# Patient Record
Sex: MALE | Race: WHITE | NOT HISPANIC OR LATINO | Employment: OTHER | URBAN - NONMETROPOLITAN AREA
[De-identification: names, ages, dates, MRNs, and addresses within clinical notes are randomized per-mention and may not be internally consistent; named-entity substitution may affect disease eponyms.]

---

## 2019-05-15 ENCOUNTER — OFFICE VISIT (OUTPATIENT)
Dept: CARDIOLOGY | Facility: CLINIC | Age: 74
End: 2019-05-15

## 2019-05-15 VITALS
DIASTOLIC BLOOD PRESSURE: 91 MMHG | SYSTOLIC BLOOD PRESSURE: 163 MMHG | HEIGHT: 69 IN | HEART RATE: 85 BPM | WEIGHT: 207.2 LBS | OXYGEN SATURATION: 98 % | BODY MASS INDEX: 30.69 KG/M2

## 2019-05-15 DIAGNOSIS — R53.83 OTHER FATIGUE: ICD-10-CM

## 2019-05-15 DIAGNOSIS — I10 ESSENTIAL HYPERTENSION: Primary | ICD-10-CM

## 2019-05-15 DIAGNOSIS — R00.2 PALPITATIONS: ICD-10-CM

## 2019-05-15 DIAGNOSIS — R06.00 PND (PAROXYSMAL NOCTURNAL DYSPNEA): ICD-10-CM

## 2019-05-15 DIAGNOSIS — G47.33 OBSTRUCTIVE SLEEP APNEA SYNDROME: ICD-10-CM

## 2019-05-15 DIAGNOSIS — R60.0 BILATERAL LEG EDEMA: ICD-10-CM

## 2019-05-15 DIAGNOSIS — R06.02 SHORTNESS OF BREATH: ICD-10-CM

## 2019-05-15 DIAGNOSIS — R06.01 ORTHOPNEA: ICD-10-CM

## 2019-05-15 DIAGNOSIS — R07.2 PRECORDIAL PAIN: ICD-10-CM

## 2019-05-15 DIAGNOSIS — E78.2 MIXED HYPERLIPIDEMIA: ICD-10-CM

## 2019-05-15 PROBLEM — K21.9 GASTROESOPHAGEAL REFLUX DISEASE WITHOUT ESOPHAGITIS: Status: ACTIVE | Noted: 2019-05-15

## 2019-05-15 PROCEDURE — 99204 OFFICE O/P NEW MOD 45 MIN: CPT | Performed by: INTERNAL MEDICINE

## 2019-05-15 RX ORDER — CLONIDINE HYDROCHLORIDE 0.1 MG/1
0.1 TABLET ORAL 2 TIMES DAILY PRN
Refills: 3 | COMMUNITY
Start: 2019-05-03

## 2019-05-15 RX ORDER — ASPIRIN 325 MG
325 TABLET ORAL DAILY PRN
COMMUNITY
End: 2019-05-15 | Stop reason: DRUGHIGH

## 2019-05-15 RX ORDER — IBUPROFEN 600 MG/1
600 TABLET ORAL 2 TIMES DAILY PRN
Refills: 1 | COMMUNITY
Start: 2019-04-02

## 2019-05-15 RX ORDER — FUROSEMIDE 40 MG/1
40 TABLET ORAL DAILY
Refills: 3 | COMMUNITY
Start: 2019-04-24

## 2019-05-15 RX ORDER — AMLODIPINE BESYLATE 5 MG/1
TABLET ORAL DAILY
Refills: 6 | COMMUNITY
Start: 2019-04-24

## 2019-05-15 RX ORDER — NITROGLYCERIN 0.4 MG/1
TABLET SUBLINGUAL
Qty: 25 TABLET | Refills: 2 | Status: SHIPPED | OUTPATIENT
Start: 2019-05-15

## 2019-05-15 RX ORDER — POTASSIUM CHLORIDE 750 MG/1
10 TABLET, FILM COATED, EXTENDED RELEASE ORAL DAILY
Refills: 3 | COMMUNITY
Start: 2019-04-24

## 2019-05-15 RX ORDER — ATORVASTATIN CALCIUM 40 MG/1
40 TABLET, FILM COATED ORAL NIGHTLY
Qty: 30 TABLET | Refills: 11 | Status: SHIPPED | OUTPATIENT
Start: 2019-05-15

## 2019-05-15 RX ORDER — OMEPRAZOLE 20 MG/1
20 CAPSULE, DELAYED RELEASE ORAL DAILY
Refills: 6 | COMMUNITY
Start: 2019-05-03

## 2019-05-15 RX ORDER — ATENOLOL 25 MG/1
25 TABLET ORAL DAILY
Qty: 30 TABLET | Refills: 11 | Status: SHIPPED | OUTPATIENT
Start: 2019-05-15

## 2019-05-15 NOTE — PROGRESS NOTES
Subjective   Mitchell Jason is a 73 y.o. male     Chief Complaint   Patient presents with   • Establish Care   • Chest Pain   • Palpitations   • Shortness of Breath       PROBLEM LIST:     1. Chest Pain  2. Shortness of breath  3. Palpitations  4. HTN  5. Hyperlipidemia  6. KULDEEP, untreated, machine misplaced  7. GERD                Specialty Problems     None            HPI:  Mr. Mitchell Jason is a 73-year-old male patient of Sarah Kelly and Dr. Cassy Hill who is seen today for evaluation of chest pain.    Mr. Jason is had chest pain for period of at least several years.  He describes a sharp left lower precordial chest pain which is non-positional, nonpleuritic, and not precipitated by exertion.  Pain lasts seconds to minutes, and is not accompanied by shortness of breath, nausea, or diaphoresis.  However, the patient describes class III levels of exertional dyspnea without cough or wheeze.  He also has frequent nocturnal awakening likely related to untreated sleep apnea.  He does describe symptoms suggestive of paroxysmal nocturnal dyspnea as well.    Mr. Jason has had no symptoms of TIA or stroke, and he does not claudicate or describe other symptoms of peripheral arterial disease.                  CURRENT MEDICATION:    Current Outpatient Medications   Medication Sig Dispense Refill   • amLODIPine (NORVASC) 5 MG tablet Daily.  6   • aspirin 325 MG tablet Take 325 mg by mouth Daily As Needed for Mild Pain  (for c/p).     • CloNIDine (CATAPRES) 0.1 MG tablet Take 0.1 mg by mouth 2 (Two) Times a Day As Needed.  3   • furosemide (LASIX) 40 MG tablet Take 40 mg by mouth Daily.  3   • ibuprofen (ADVIL,MOTRIN) 600 MG tablet Take 600 mg by mouth 2 (Two) Times a Day As Needed. for pain  1   • omeprazole (priLOSEC) 20 MG capsule Take 20 mg by mouth Daily.  6   • potassium chloride (K-DUR) 10 MEQ CR tablet Take 10 mEq by mouth Daily.  3     No current facility-administered medications for this visit.         ALLERGIES:    Codeine    PAST MEDICAL HISTORY:    Past Medical History:   Diagnosis Date   • Chest pain    • GERD (gastroesophageal reflux disease)    • Hyperlipidemia    • Hypertension    • Sleep apnea     does not have machine anymore, [facility]       SURGICAL HISTORY:    Past Surgical History:   Procedure Laterality Date   • BACK SURGERY     • INNER EAR SURGERY         SOCIAL HISTORY:    Social History     Socioeconomic History   • Marital status:      Spouse name: Not on file   • Number of children: Not on file   • Years of education: Not on file   • Highest education level: Not on file   Tobacco Use   • Smoking status: Never Smoker   • Smokeless tobacco: Never Used   Substance and Sexual Activity   • Alcohol use: Yes     Frequency: Never     Comment: wine and alcohol   • Drug use: No       FAMILY HISTORY:    Family History   Problem Relation Age of Onset   • No Known Problems Mother    • Hypertension Father        Review of Systems   Constitutional: Negative.  Negative for chills, diaphoresis, fever and unexpected weight change.   HENT: Positive for hearing loss and nosebleeds.    Eyes: Positive for visual disturbance (glasses prn).   Respiratory: Positive for shortness of breath.         Describes orthopnea/PND   Cardiovascular: Positive for chest pain (sharp to left lateral chest. Episode may last seconds. Occurs randomly. occurs several times a day. No precipitating or alleviating factors. ), palpitations and leg swelling.   Gastrointestinal: Negative for blood in stool (no melena,hematuria,hemoptysis,hematochezia), constipation and diarrhea.   Endocrine: Negative.    Genitourinary: Negative.    Musculoskeletal: Negative.    Skin: Negative.    Allergic/Immunologic: Positive for environmental allergies.   Neurological: Negative.    Hematological: Bruises/bleeds easily.   Psychiatric/Behavioral: Positive for sleep disturbance.       VISIT VITALS:  Vitals:    05/15/19 1500   BP: 163/91  "  BP Location: Left arm   Patient Position: Sitting   Pulse: 85   SpO2: 98%   Weight: 94 kg (207 lb 3.2 oz)   Height: 175.3 cm (69\")      /91 (BP Location: Left arm, Patient Position: Sitting)   Pulse 85   Ht 175.3 cm (69\")   Wt 94 kg (207 lb 3.2 oz)   SpO2 98%   BMI 30.60 kg/m²     RECENT LABS:    Objective       Physical Exam   Constitutional: He is oriented to person, place, and time. He appears well-developed and well-nourished. No distress.   HENT:   Head: Normocephalic and atraumatic.   No oral lesions   Eyes: Conjunctivae and EOM are normal. Pupils are equal, round, and reactive to light.   No ptosis or lid lag  ALLYN  Extra-ocular motions intact   Neck: Normal range of motion. Neck supple. No hepatojugular reflux and no JVD present. Carotid bruit is not present. No tracheal deviation present.   Nl. Carotid upstrokes  No masses to left neck area     Cardiovascular: Normal rate, regular rhythm, S2 normal and intact distal pulses. Frequent extrasystoles are present.   Murmur heard.   Systolic murmur is present.  S1 decreased  No MR  No TR  2/6 systolic ejection murmur RUSB   Pulmonary/Chest: Effort normal and breath sounds normal. He has no wheezes. He has no rhonchi. He has no rales.   Nl. Expir. Phase  Nl. Breath sound intensity     Abdominal: Soft. Bowel sounds are normal. He exhibits no distension, no abdominal bruit and no mass. There is no tenderness. There is no rebound and no guarding.   No organomegaly   Musculoskeletal: Normal range of motion. He exhibits edema. He exhibits no tenderness or deformity.   LLE, 1+ edema to lower calf, excellent PT, palpable DP, cap. Refill 5 sec.  RLE, 1+ edema mid calf, nl.pedal pulses, nl. Cap. refill   Neurological: He is alert and oriented to person, place, and time.   Skin: Skin is warm and dry. No rash noted. No erythema. No pallor.   Psychiatric: He has a normal mood and affect. His behavior is normal. Judgment and thought content normal.   Nursing note " and vitals reviewed.      Procedures      Assessment/Plan   #1.  Chest pain.  The patient describes chest pain atypical for angina with some features suggestive of ischemia.  He is at moderate to high risk for coronary artery disease, therefore, risk stratification is indicated.  We will perform pharmacologic stress testing in that regard as the patient would be unable to walk adequately on a treadmill.  He is given a prescription for sublingual nitroglycerin with instructions in its use.    2.  Orthopnea and PND.  I would like to obtain an echocardiogram to assess LV systolic and diastolic performance, LV filling pressures, and pulmonary pressures.    3.  Inadequately controlled systemic hypertension.  In that regard we will add a atenolol 25 mg daily to the patient's current regimen and perform serial blood pressure checks.    4.  Dyslipidemia.  With suspected coronary artery disease I feel the patient should be on high-dose statin.  Mr. Jason had no improvement in symptoms when pravastatin was discontinued.  Therefore, we will restart statin in the form of atorvastatin 40 mg daily with coenzyme Q 10 and tonic water if needed for muscle and joint pain.    5.  The patient states he is unable to tolerate his CPAP mask because of a sense of smothering.  I think it would be beneficial to have him reassessed from the standpoint of treating obstructive sleep apnea as he may be able to tolerate new or different mask or machine.    6.  The patient will follow up with Sarah Kelly as instructed, and in our office after testing or on a as needed basis for symptoms, blood pressures, or medication intolerance as discussed in detail today.   Diagnosis Plan   1. Essential hypertension     2. Mixed hyperlipidemia     3. Palpitations     4. Shortness of breath     5. Obstructive sleep apnea syndrome     6. Precordial pain     7. Orthopnea     8. PND (paroxysmal nocturnal dyspnea)     9. Bilateral leg edema     10. Other fatigue          No Follow-up on file.              Patient's Body mass index is 30.6 kg/m². BMI is above normal parameters. Recommendations include: educational material and referral to primary care.       Damaris Chirinos LPN    Scribed for Dr. Ruy Lazaro by Damaris Chirinos LPN May 15, 2019 3:30 PM         Electronically signed by:            This note is dictated utilizing voice recognition software.  Although this record has been proof read, transcriptional errors may still be present. If questions occur regarding the content of this record please do not hesitate to call our office.

## 2019-05-15 NOTE — PATIENT INSTRUCTIONS
Atorvastatin tablets  What is this medicine?  ATORVASTATIN (a TORE va sta tin) is known as a HMG-CoA reductase inhibitor or 'statin'. It lowers the level of cholesterol and triglycerides in the blood. This drug may also reduce the risk of heart attack, stroke, or other health problems in patients with risk factors for heart disease. Diet and lifestyle changes are often used with this drug.  This medicine may be used for other purposes; ask your health care provider or pharmacist if you have questions.  COMMON BRAND NAME(S): Lipitor  What should I tell my health care provider before I take this medicine?  They need to know if you have any of these conditions:  -diabetes  -if you often drink alcohol  -history of stroke  -kidney disease  -liver disease  -muscle aches or weakness  -thyroid disease  -an unusual or allergic reaction to atorvastatin, other medicines, foods, dyes, or preservatives  -pregnant or trying to get pregnant  -breast-feeding  How should I use this medicine?  Take this medicine by mouth with a glass of water. Follow the directions on the prescription label. You can take it with or without food. If it upsets your stomach, take it with food. Do not take with grapefruit juice. Take your medicine at regular intervals. Do not take it more often than directed. Do not stop taking except on your doctor's advice.  Talk to your pediatrician regarding the use of this medicine in children. While this drug may be prescribed for children as young as 10 for selected conditions, precautions do apply.  Overdosage: If you think you have taken too much of this medicine contact a poison control center or emergency room at once.  NOTE: This medicine is only for you. Do not share this medicine with others.  What if I miss a dose?  If you miss a dose, take it as soon as you can. If your next dose is to be taken in less than 12 hours, then do not take the missed dose. Take the next dose at your regular time. Do not take  double or extra doses.  What may interact with this medicine?  Do not take this medicine with any of the following medications:  -certain antivirals for HIV or hepatitis  -posaconazole  -supplements like red yeast rice  This medicine may also interact with the following medications:  -alcohol  -birth control pills  -certain antibiotics like erythromycin and clarithromycin  -certain medicines for cholesterol like fenofibrate, gemfibrozil, and niacin  -certain medicines for fungal infections like ketoconazole and itraconazole  -colchicine  -cyclosporine  -digoxin  -grapefruit juice  -rifampin  -supplements like black cohosh and Singh's wort  This list may not describe all possible interactions. Give your health care provider a list of all the medicines, herbs, non-prescription drugs, or dietary supplements you use. Also tell them if you smoke, drink alcohol, or use illegal drugs. Some items may interact with your medicine.  What should I watch for while using this medicine?  Visit your doctor or health care professional for regular check-ups. You may need regular tests to make sure your liver is working properly.  Your health care professional may tell you to stop taking this medicine if you develop muscle problems. If your muscle problems do not go away after stopping this medicine, contact your health care professional.  Do not become pregnant while taking this medicine. Women should inform their health care professional if they wish to become pregnant or think they might be pregnant. There is a potential for serious side effects to an unborn child. Talk to your health care professional or pharmacist for more information. Do not breast-feed an infant while taking this medicine.  This medicine may affect blood sugar levels. If you have diabetes, check with your doctor or health care professional before you change your diet or the dose of your diabetic medicine.  If you are going to need surgery or other procedure,  tell your doctor that you are using this medicine.  This drug is only part of a total heart-health program. Your doctor or a dietician can suggest a low-cholesterol and low-fat diet to help. Avoid alcohol and smoking, and keep a proper exercise schedule.  This medicine may cause a decrease in Co-Enzyme Q-10. You should make sure that you get enough Co-Enzyme Q-10 while you are taking this medicine. Discuss the foods you eat and the vitamins you take with your health care professional.  What side effects may I notice from receiving this medicine?  Side effects that you should report to your doctor or health care professional as soon as possible:  -allergic reactions like skin rash, itching or hives, swelling of the face, lips, or tongue  -fever  -joint pain  -loss of memory  -redness, blistering, peeling or loosening of the skin, including inside the mouth  -signs and symptoms of liver injury like dark yellow or brown urine; general ill feeling or flu-like symptoms; light-belly pain; unusually weak or tired; yellowing of the eyes or skin  -signs and symptoms of muscle injury like dark urine; trouble passing urine or change in the amount of urine; unusually weak or tired; muscle pain or side or back pain  Side effects that usually do not require medical attention (report to your doctor or health care professional if they continue or are bothersome):  -diarrhea  -nausea  -stomach pain  -trouble sleeping  -upset stomach  This list may not describe all possible side effects. Call your doctor for medical advice about side effects. You may report side effects to FDA at 1-582-FDA-0410.  Where should I keep my medicine?  Keep out of the reach of children.  Store between 20 and 25 degrees C (68 and 77 degrees F). Throw away any unused medicine after the expiration date.  NOTE: This sheet is a summary. It may not cover all possible information. If you have questions about this medicine, talk to your doctor, pharmacist, or  health care provider.  © 2019 [employer] (2018-08-17 21:55:00)  Nitroglycerin sublingual tablets  What is this medicine?  NITROGLYCERIN (shiv troe GLI ser in) is a type of vasodilator. It relaxes blood vessels, increasing the blood and oxygen supply to your heart. This medicine is used to relieve chest pain caused by angina. It is also used to prevent chest pain before activities like climbing stairs, going outdoors in cold weather, or sexual activity.  This medicine may be used for other purposes; ask your health care provider or pharmacist if you have questions.  COMMON BRAND NAME(S): Nitroquick, Nitrostat, Nitrotab  What should I tell my health care provider before I take this medicine?  They need to know if you have any of these conditions:  -anemia  -head injury, recent stroke, or bleeding in the brain  -liver disease  -previous heart attack  -an unusual or allergic reaction to nitroglycerin, other medicines, foods, dyes, or preservatives  -pregnant or trying to get pregnant  -breast-feeding  How should I use this medicine?  Take this medicine by mouth as needed. At the first sign of an angina attack (chest pain or tightness) place one tablet under your tongue. You can also take this medicine 5 to 10 minutes before an event likely to produce chest pain. Follow the directions on the prescription label. Let the tablet dissolve under the tongue. Do not swallow whole. Replace the dose if you accidentally swallow it. It will help if your mouth is not dry. Saliva around the tablet will help it to dissolve more quickly. Do not eat or drink, smoke or chew tobacco while a tablet is dissolving. If you are not better within 5 minutes after taking ONE dose of nitroglycerin, call 9-1-1 immediately to seek emergency medical care. Do not take more than 3 nitroglycerin tablets over 15 minutes.  If you take this medicine often to relieve symptoms of angina, your doctor or health care professional may provide you with  different instructions to manage your symptoms. If symptoms do not go away after following these instructions, it is important to call 9-1-1 immediately. Do not take more than 3 nitroglycerin tablets over 15 minutes.  Talk to your pediatrician regarding the use of this medicine in children. Special care may be needed.  Overdosage: If you think you have taken too much of this medicine contact a poison control center or emergency room at once.  NOTE: This medicine is only for you. Do not share this medicine with others.  What if I miss a dose?  This does not apply. This medicine is only used as needed.  What may interact with this medicine?  Do not take this medicine with any of the following medications:  -certain migraine medicines like ergotamine and dihydroergotamine (DHE)  -medicines used to treat erectile dysfunction like sildenafil, tadalafil, and vardenafil  -riociguat  This medicine may also interact with the following medications:  -alteplase  -aspirin  -heparin  -medicines for high blood pressure  -medicines for mental depression  -other medicines used to treat angina  -phenothiazines like chlorpromazine, mesoridazine, prochlorperazine, thioridazine  This list may not describe all possible interactions. Give your health care provider a list of all the medicines, herbs, non-prescription drugs, or dietary supplements you use. Also tell them if you smoke, drink alcohol, or use illegal drugs. Some items may interact with your medicine.  What should I watch for while using this medicine?  Tell your doctor or health care professional if you feel your medicine is no longer working.  Keep this medicine with you at all times. Sit or lie down when you take your medicine to prevent falling if you feel dizzy or faint after using it. Try to remain calm. This will help you to feel better faster. If you feel dizzy, take several deep breaths and lie down with your feet propped up, or bend forward with your head resting  between your knees.  You may get drowsy or dizzy. Do not drive, use machinery, or do anything that needs mental alertness until you know how this drug affects you. Do not stand or sit up quickly, especially if you are an older patient. This reduces the risk of dizzy or fainting spells. Alcohol can make you more drowsy and dizzy. Avoid alcoholic drinks.  Do not treat yourself for coughs, colds, or pain while you are taking this medicine without asking your doctor or health care professional for advice. Some ingredients may increase your blood pressure.  What side effects may I notice from receiving this medicine?  Side effects that you should report to your doctor or health care professional as soon as possible:  -blurred vision  -dry mouth  -skin rash  -sweating  -the feeling of extreme pressure in the head  -unusually weak or tired  Side effects that usually do not require medical attention (report to your doctor or health care professional if they continue or are bothersome):  -flushing of the face or neck  -headache  -irregular heartbeat, palpitations  -nausea, vomiting  This list may not describe all possible side effects. Call your doctor for medical advice about side effects. You may report side effects to FDA at 9-485-FDA-8419.  Where should I keep my medicine?  Keep out of the reach of children.  Store at room temperature between 20 and 25 degrees C (68 and 77 degrees F). Store in original container. Protect from light and moisture. Keep tightly closed. Throw away any unused medicine after the expiration date.  NOTE: This sheet is a summary. It may not cover all possible information. If you have questions about this medicine, talk to your doctor, pharmacist, or health care provider.  © 2019 [employer]/Gold Standard (2014-10-16 17:57:36)  Atenolol tablets  What is this medicine?  ATENOLOL (a TEN oh lole) is a beta-blocker. Beta-blockers reduce the workload on the heart and help it to beat more regularly. This  medicine is used to treat high blood pressure and to prevent chest pain. It is also used to protect the heart during a heart attack and to prevent an additional heart attack from occurring.  This medicine may be used for other purposes; ask your health care provider or pharmacist if you have questions.  COMMON BRAND NAME(S): Tenormin  What should I tell my health care provider before I take this medicine?  They need to know if you have any of these conditions:  -diabetes  -heart or vessel disease like slow heart rate, worsening heart failure, heart block, sick sinus syndrome or Raynaud's disease  -kidney disease  -lung or breathing disease, like asthma or emphysema  -pheochromocytoma  -thyroid disease  -an unusual or allergic reaction to atenolol, other beta-blockers, medicines, foods, dyes, or preservatives  -pregnant or trying to get pregnant  -breast-feeding  How should I use this medicine?  Take this medicine by mouth with a drink of water. Follow the directions on the prescription label. This medicine may be taken with or without food. Take your medicine at regular intervals. Do not take more medicine than directed. Do not stop taking this medicine suddenly. This could lead to serious heart-related effects.  Talk to your pediatrician regarding the use of this medicine in children. Special care may be needed.  Overdosage: If you think you have taken too much of this medicine contact a poison control center or emergency room at once.  NOTE: This medicine is only for you. Do not share this medicine with others.  What if I miss a dose?  If you miss a dose, take it as soon as you can. If it is almost time for your next dose, take only that dose. Do not take double or extra doses.  What may interact with this medicine?  This medicine may interact with the following medications:  -certain medicines for blood pressure, heart disease, irregular heart  beat  -clonidine  -digoxin  -diuretics  -dobutamine  -epinephrine  -isoproterenol  -NSAIDs, medicines for pain and inflammation, like ibuprofen or naproxen  -reserpine  This list may not describe all possible interactions. Give your health care provider a list of all the medicines, herbs, non-prescription drugs, or dietary supplements you use. Also tell them if you smoke, drink alcohol, or use illegal drugs. Some items may interact with your medicine.  What should I watch for while using this medicine?  Visit your doctor or health care professional for regular check ups. Check your blood pressure and pulse rate regularly. Ask your health care professional what your blood pressure and pulse rate should be, and when you should contact him or her.  You may get drowsy or dizzy. Do not drive, use machinery, or do anything that needs mental alertness until you know how this medicine affects you. Do not stand or sit up quickly. Alcohol may interfere with the effect of this medicine. Avoid alcoholic drinks.  This medicine can affect blood sugar levels. If you have diabetes, check with your doctor or health care professional before you change your diet or the dose of your diabetic medicine.  Do not treat yourself for coughs, colds, or pain while you are taking this medicine without asking your doctor or health care professional for advice. Some ingredients may increase your blood pressure.  What side effects may I notice from receiving this medicine?  Side effects that you should report to your doctor or health care professional as soon as possible:  -allergic reactions like skin rash, itching or hives, swelling of the face, lips, or tongue  -breathing problems  -changes in vision  -chest pain  -cold, tingling, or numb hands or feet  -depression  -fast, irregular heartbeat  -feeling faint or lightheaded, falls  -fever with sore throat  -rapid weight gain  -swollen ankles, legs  Side effects that usually do not require  medical attention (report to your doctor or health care professional if they continue or are bothersome):  -anxiety, nervous  -diarrhea  -dry skin  -change in sex drive or performance  -headache  -nightmares or trouble sleeping  -short term memory loss  -stomach upset  -unusually tired  This list may not describe all possible side effects. Call your doctor for medical advice about side effects. You may report side effects to FDA at 0-702-DQT-6450.  Where should I keep my medicine?  Keep out of the reach of children.  Store at room temperature between 20 and 25 degrees C (68 and 77 degrees F). Close tightly and protect from light. Throw away any unused medicine after the expiration date.  NOTE: This sheet is a summary. It may not cover all possible information. If you have questions about this medicine, talk to your doctor, pharmacist, or health care provider.  © 2019 [employer]/Gold Standard (2014-08-24 14:21:28)  Obesity, Adult  Obesity is the condition of having too much total body fat. Being overweight or obese means that your weight is greater than what is considered healthy for your body size. Obesity is determined by a measurement called BMI. BMI is an estimate of body fat and is calculated from height and weight. For adults, a BMI of 30 or higher is considered obese.  Obesity can eventually lead to other health concerns and major illnesses, including:  · Stroke.  · Coronary artery disease (CAD).  · Type 2 diabetes.  · Some types of cancer, including cancers of the colon, breast, uterus, and gallbladder.  · Osteoarthritis.  · High blood pressure (hypertension).  · High cholesterol.  · Sleep apnea.  · Gallbladder stones.  · Infertility problems.    What are the causes?  The main cause of obesity is taking in (consuming) more calories than your body uses for energy. Other factors that contribute to this condition may include:  · Being born with genes that make you more likely to become obese.  · Having a medical  condition that causes obesity. These conditions include:  ? Hypothyroidism.  ? Polycystic ovarian syndrome (PCOS).  ? Binge-eating disorder.  ? Cushing syndrome.  · Taking certain medicines, such as steroids, antidepressants, and seizure medicines.  · Not being physically active (sedentary lifestyle).  · Living where there are limited places to exercise safely or buy healthy foods.  · Not getting enough sleep.    What increases the risk?  The following factors may increase your risk of this condition:  · Having a family history of obesity.  · Being a woman of -American descent.  · Being a man of  descent.    What are the signs or symptoms?  Having excessive body fat is the main symptom of this condition.  How is this diagnosed?  This condition may be diagnosed based on:  · Your symptoms.  · Your medical history.  · A physical exam. Your health care provider may measure:  ? Your BMI. If you are an adult with a BMI between 25 and less than 30, you are considered overweight. If you are an adult with a BMI of 30 or higher, you are considered obese.  ? The distances around your hips and your waist (circumferences). These may be compared to each other to help diagnose your condition.  ? Your skinfold thickness. Your health care provider may gently pinch a fold of your skin and measure it.    How is this treated?  Treatment for this condition often includes changing your lifestyle. Treatment may include some or all of the following:  · Dietary changes. Work with your health care provider and a dietitian to set a weight-loss goal that is healthy and reasonable for you. Dietary changes may include eating:  ? Smaller portions. A portion size is the amount of a particular food that is healthy for you to eat at one time. This varies from person to person.  ? Low-calorie or low-fat options.  ? More whole grains, fruits, and vegetables.  · Regular physical activity. This may include aerobic activity (cardio) and  strength training.  · Medicine to help you lose weight. Your health care provider may prescribe medicine if you are unable to lose 1 pound a week after 6 weeks of eating more healthily and doing more physical activity.  · Surgery. Surgical options may include gastric banding and gastric bypass. Surgery may be done if:  ? Other treatments have not helped to improve your condition.  ? You have a BMI of 40 or higher.  ? You have life-threatening health problems related to obesity.    Follow these instructions at home:    Eating and drinking    · Follow recommendations from your health care provider about what you eat and drink. Your health care provider may advise you to:  ? Limit fast foods, sweets, and processed snack foods.  ? Choose low-fat options, such as low-fat milk instead of whole milk.  ? Eat 5 or more servings of fruits or vegetables every day.  ? Eat at home more often. This gives you more control over what you eat.  ? Choose healthy foods when you eat out.  ? Learn what a healthy portion size is.  ? Keep low-fat snacks on hand.  ? Avoid sugary drinks, such as soda, fruit juice, iced tea sweetened with sugar, and flavored milk.  ? Eat a healthy breakfast.  · Drink enough water to keep your urine clear or pale yellow.  · Do not go without eating for long periods of time (do not fast) or follow a fad diet. Fasting and fad diets can be unhealthy and even dangerous.  Physical Activity  · Exercise regularly, as told by your health care provider. Ask your health care provider what types of exercise are safe for you and how often you should exercise.  · Warm up and stretch before being active.  · Cool down and stretch after being active.  · Rest between periods of activity.  Lifestyle  · Limit the time that you spend in front of your TV, computer, or video game system.  · Find ways to reward yourself that do not involve food.  · Limit alcohol intake to no more than 1 drink a day for nonpregnant women and 2 drinks  a day for men. One drink equals 12 oz of beer, 5 oz of wine, or 1½ oz of hard liquor.  General instructions  · Keep a weight loss journal to keep track of the food you eat and how much you exercise you get.  · Take over-the-counter and prescription medicines only as told by your health care provider.  · Take vitamins and supplements only as told by your health care provider.  · Consider joining a support group. Your health care provider may be able to recommend a support group.  · Keep all follow-up visits as told by your health care provider. This is important.  Contact a health care provider if:  · You are unable to meet your weight loss goal after 6 weeks of dietary and lifestyle changes.  This information is not intended to replace advice given to you by your health care provider. Make sure you discuss any questions you have with your health care provider.  Document Released: 01/25/2006 Document Revised: 05/22/2017 Document Reviewed: 10/05/2016  [employer] Interactive Patient Education © 2019 [employer]  MyPlate from [employer]  MyPlate is an outline of a general healthy diet based on the 2010 Dietary Guidelines for Americans, from the [place] Department of Agriculture (USDA). It sets guidelines for how much food you should eat from each food group based on your age, sex, and level of physical activity.  What are tips for following MyPlate?  To follow MyPlate recommendations:  · Eat a wide variety of fruits and vegetables, grains, and protein foods.  · Serve smaller portions and eat less food throughout the day.  · Limit portion sizes to avoid overeating.  · Enjoy your food.  · Get at least 150 minutes of exercise every week. This is about 30 minutes each day, 5 or more days per week.    It can be difficult to have every meal look like MyPlate. Think about MyPlate as eating guidelines for an entire day, rather than each individual meal.  Fruits and Vegetables  · Make half of your plate fruits and vegetables.  · Eat many  different colors of fruits and vegetables each day.  · For a 2,000 calorie daily food plan, eat:  ? 2½ cups of vegetables every day.  ? 2 cups of fruit every day.  · 1 cup is equal to:  ? 1 cup raw or cooked vegetables.  ? 1 cup raw fruit.  ? 1 medium-sized orange, apple, or banana.  ? 1 cup 100% fruit or vegetable juice.  ? 2 cups raw leafy greens, such as lettuce, spinach, or kale.  ? ½ cup dried fruit.  Grains  · One fourth of your plate should be grains.  · Make at least half of the grains you eat each day whole grains.  · For a 2,000 calorie daily food plan, eat 6 oz of grains every day.  · 1 oz is equal to:  ? 1 slice bread.  ? 1 cup cereal.  ? ½ cup cooked rice, cereal, or pasta.  Protein  · One fourth of your plate should be protein.  · Eat a wide variety of protein foods, including meat, poultry, fish, eggs, beans, nuts, and tofu.  · For a 2,000 calorie daily food plan, eat 5½ oz of protein every day.  · 1 oz is equal to:  ? 1 oz meat, poultry, or fish.  ? ¼ cup cooked beans.  ? 1 egg.  ? ½ oz nuts or seeds.  ? 1 Tbsp peanut butter.  Dairy  · Drink fat-free or low-fat (1%) milk.  · Eat or drink dairy as a side to meals.  · For a 2,000 calorie daily food plan, eat or drink 3 cups of dairy every day.  · 1 cup is equal to:  ? 1 cup milk, yogurt, cottage cheese, or soy milk (soy beverage).  ? 2 oz processed cheese.  ? 1½ oz natural cheese.  Fats, oils, salt, and sugars  · Only small amounts of oils are recommended.  · Avoid foods that are high in calories and low in nutritional value (empty calories), like foods high in fat or added sugars.  · Choose foods that are low in salt (sodium). Choose foods that have less than 140 milligrams (mg) of sodium per serving.  · Drink water instead of sugary drinks. Drink enough water each day to keep your urine pale yellow.  Where to find support  · Work with your health care provider or a nutrition specialist (dietitian) to develop a customized eating plan that is right for  you.  · Download an jaron (mobile application) to help you track your daily food intake.  Where to find more information  · Go to ChooseMyPlate.gov for more information.  · Learn more and log your daily food intake according to MyPlate using USDA's SuperTracker: www.V-me Mediacker.usda.gov  Summary  · MyPlate is a general guideline for healthy eating from the USDA. It is based on the 2010 Dietary Guidelines for Americans.  · In general, fruits and vegetables should take up ½ of your plate, grains should take up ¼ of your plate, and protein should take up ¼ of your plate.  This information is not intended to replace advice given to you by your health care provider. Make sure you discuss any questions you have with your health care provider.  Document Released: 01/06/2009 Document Revised: 03/19/2018 Document Reviewed: 03/19/2018  Elsevier Interactive Patient Education © 2019 Elsevier Inc.

## 2019-07-05 ENCOUNTER — TELEPHONE (OUTPATIENT)
Dept: CARDIOLOGY | Facility: CLINIC | Age: 74
End: 2019-07-05

## 2019-07-05 NOTE — TELEPHONE ENCOUNTER
THE PRACTICE MANAGER CALLED THE PT AND INSTRUCTED HIM TO CONTACT [facility] TO ASSIST WITH OBTAINING AUTHORIZATION FOR HIS HEPATIC PANEL.  PT STATED HE WOULD CALL TODAY.

## 2019-07-09 ENCOUNTER — TRANSCRIBE ORDERS (OUTPATIENT)
Dept: CARDIOLOGY | Facility: CLINIC | Age: 74
End: 2019-07-09

## 2019-07-09 DIAGNOSIS — E78.2 MIXED HYPERLIPIDEMIA: ICD-10-CM

## 2019-07-09 DIAGNOSIS — R07.2 PRECORDIAL PAIN: ICD-10-CM

## 2019-07-09 DIAGNOSIS — R06.01 ORTHOPNEA: ICD-10-CM

## 2019-07-09 DIAGNOSIS — R53.83 OTHER FATIGUE: ICD-10-CM

## 2019-07-09 DIAGNOSIS — R06.02 SHORTNESS OF BREATH: Primary | ICD-10-CM

## 2019-07-09 DIAGNOSIS — R60.0 BILATERAL LEG EDEMA: ICD-10-CM

## 2019-07-09 DIAGNOSIS — I10 ESSENTIAL HYPERTENSION: ICD-10-CM

## 2019-07-09 DIAGNOSIS — R06.00 PND (PAROXYSMAL NOCTURNAL DYSPNEA): ICD-10-CM

## 2019-07-09 DIAGNOSIS — R00.2 PALPITATIONS: ICD-10-CM

## 2019-07-11 ENCOUNTER — HOSPITAL ENCOUNTER (OUTPATIENT)
Dept: CARDIOLOGY | Facility: HOSPITAL | Age: 74
End: 2019-07-11

## 2019-07-29 ENCOUNTER — DOCUMENTATION (OUTPATIENT)
Dept: CARDIOLOGY | Facility: CLINIC | Age: 74
End: 2019-07-29

## 2019-07-29 NOTE — PROGRESS NOTES
Spoke with Mr. Jason about rescheduling stress test and echo. Does want to proceed with having test's done. Number for [facility] scheduling given to him to reschedule testing. Transportation issues in past. Message placed in referal communication area and sent to IRIS Forte. PH,LPN

## 2019-08-08 ENCOUNTER — OFFICE VISIT (OUTPATIENT)
Dept: CARDIOLOGY | Facility: CLINIC | Age: 74
End: 2019-08-08

## 2019-08-08 VITALS
HEIGHT: 69 IN | DIASTOLIC BLOOD PRESSURE: 81 MMHG | WEIGHT: 200.6 LBS | OXYGEN SATURATION: 98 % | HEART RATE: 77 BPM | BODY MASS INDEX: 29.71 KG/M2 | SYSTOLIC BLOOD PRESSURE: 131 MMHG

## 2019-08-08 DIAGNOSIS — R00.2 PALPITATIONS: ICD-10-CM

## 2019-08-08 DIAGNOSIS — R06.02 SHORTNESS OF BREATH: Primary | ICD-10-CM

## 2019-08-08 DIAGNOSIS — R07.9 CHEST PAIN, UNSPECIFIED TYPE: ICD-10-CM

## 2019-08-08 PROCEDURE — 99214 OFFICE O/P EST MOD 30 MIN: CPT | Performed by: PHYSICIAN ASSISTANT

## 2019-08-08 RX ORDER — SILDENAFIL 50 MG/1
50 TABLET, FILM COATED ORAL AS NEEDED
COMMUNITY
End: 2019-09-11 | Stop reason: ALTCHOICE

## 2019-08-08 RX ORDER — RANOLAZINE 500 MG/1
500 TABLET, EXTENDED RELEASE ORAL 2 TIMES DAILY
Qty: 60 TABLET | Refills: 6 | Status: SHIPPED | OUTPATIENT
Start: 2019-08-08

## 2019-08-08 NOTE — PROGRESS NOTES
Problem list     Subjective   Mitchell Jason is a 73 y.o. male     Chief Complaint   Patient presents with   • Chest Pain     presents as hospital f/u   • Palpitations   • Shortness of Breath   • Hypertension       HPI    Patient is a 73-year-old male who presents back to the office for follow-up.  Patient was recently hospitalized and monitored overnight because of chest pain.  Patient describes on Thursday that he went to receive injections including a steroid shot.  He then began to notice palpitations over the weekend and Sunday started developing chest discomfort.  He described a pressure and aching sensation.  He went to a hospital in [place] and was admitted overnight.  There was some questionable abnormalities at baseline on EKG and because of his symptoms he was monitored and discharged the next day.    Patient was seen in May 2019 here at our office and scheduled for ischemia assessment but did not have that done.  He described having stress test and echocardiogram performed approximately 2 years ago in [place] which were unremarkable per his report.    Patient's palpitations has resolved.  He felt that this might be likely due to his injections.  However, he continues to have precordial discomfort.  It is as a pressure sensation and as an aching sensation.  This will occur at random and resolve.  He did take one nitroglycerin since hospital discharge.  Patient's dyspnea is mild to moderate when trying to exert or do activities.  This is been chronic as well.  He denies any PND orthopnea.    Palpitations have resolved.  He has no dizziness presyncope or syncope and otherwise is doing well      Outpatient Encounter Medications as of 8/8/2019   Medication Sig Dispense Refill   • amLODIPine (NORVASC) 5 MG tablet Daily.  6   • aspirin 81 MG tablet Take 1 tablet by mouth Daily. 30 tablet 11   • atenolol (TENORMIN) 25 MG tablet Take 1 tablet by mouth Daily. 30 tablet 11   • atorvastatin (LIPITOR) 40 MG tablet  Take 1 tablet by mouth Every Night. 30 tablet 11   • CloNIDine (CATAPRES) 0.1 MG tablet Take 0.1 mg by mouth 2 (Two) Times a Day As Needed.  3   • furosemide (LASIX) 40 MG tablet Take 40 mg by mouth Daily.  3   • ibuprofen (ADVIL,MOTRIN) 600 MG tablet Take 600 mg by mouth 2 (Two) Times a Day As Needed. for pain  1   • nitroglycerin (NITROSTAT) 0.4 MG SL tablet 1 under the tongue as needed for angina, may repeat q5mins for up three doses within 15 minutes 25 tablet 2   • omeprazole (priLOSEC) 20 MG capsule Take 20 mg by mouth Daily.  6   • potassium chloride (K-DUR) 10 MEQ CR tablet Take 10 mEq by mouth Daily.  3   • sildenafil (VIAGRA) 50 MG tablet Take 50 mg by mouth As Needed for erectile dysfunction.     • ranolazine (RANEXA) 500 MG 12 hr tablet Take 1 tablet by mouth 2 (Two) Times a Day. 60 tablet 6     No facility-administered encounter medications on file as of 8/8/2019.        Codeine    Past Medical History:   Diagnosis Date   • Chest pain    • GERD (gastroesophageal reflux disease)    • Hyperlipidemia    • Hypertension    • Sleep apnea     does not have machine anymore, [facility]       Social History     Socioeconomic History   • Marital status:      Spouse name: Not on file   • Number of children: Not on file   • Years of education: Not on file   • Highest education level: Not on file   Tobacco Use   • Smoking status: Never Smoker   • Smokeless tobacco: Never Used   Substance and Sexual Activity   • Alcohol use: Yes     Frequency: Never     Comment: wine and alcohol   • Drug use: No       Family History   Problem Relation Age of Onset   • No Known Problems Mother    • Hypertension Father        Review of Systems   Constitutional: Positive for diaphoresis and fatigue.   HENT: Positive for hearing loss.    Eyes: Negative.    Respiratory: Positive for chest tightness, shortness of breath (with daily activity and times at rest) and wheezing.    Cardiovascular: Positive for chest pain and  "palpitations.   Gastrointestinal: Positive for vomiting.   Endocrine: Negative.    Genitourinary: Negative.    Musculoskeletal: Positive for arthralgias, back pain, myalgias and neck pain.   Allergic/Immunologic: Negative.    Neurological: Negative.    Hematological: Bruises/bleeds easily.   Psychiatric/Behavioral: Positive for agitation and sleep disturbance (wakes up smothering/soa). The patient is nervous/anxious.    All other systems reviewed and are negative.      Objective   Vitals:    08/08/19 0840   BP: 131/81   BP Location: Left arm   Patient Position: Sitting   Pulse: 77   SpO2: 98%   Weight: 91 kg (200 lb 9.6 oz)   Height: 175.3 cm (69.02\")      /81 (BP Location: Left arm, Patient Position: Sitting)   Pulse 77   Ht 175.3 cm (69.02\")   Wt 91 kg (200 lb 9.6 oz)   SpO2 98%   BMI 29.61 kg/m²     Lab Results (most recent)     None          Physical Exam   Constitutional: He is oriented to person, place, and time. He appears well-developed and well-nourished. No distress.   HENT:   Head: Normocephalic and atraumatic.   Eyes: EOM are normal. Pupils are equal, round, and reactive to light.   Neck: No JVD present.   Cardiovascular: Normal rate, regular rhythm, normal heart sounds and intact distal pulses. Exam reveals no gallop and no friction rub.   No murmur heard.  Pulmonary/Chest: Effort normal and breath sounds normal. No respiratory distress. He has no wheezes. He has no rales. He exhibits no tenderness.   Musculoskeletal: Normal range of motion. He exhibits no edema.   Neurological: He is alert and oriented to person, place, and time. No cranial nerve deficit.   Skin: Skin is warm and dry. No rash noted. No erythema. No pallor.   Psychiatric: He has a normal mood and affect. His behavior is normal.   Nursing note and vitals reviewed.      Procedure   Procedures       Assessment/Plan     Problems Addressed this Visit        Cardiovascular and Mediastinum    Palpitations    Relevant Orders    Adult " Transthoracic Echo Complete W/ Cont if Necessary Per Protocol    Stress Test With Myocardial Perfusion One Day       Respiratory    Shortness of breath - Primary    Relevant Orders    Adult Transthoracic Echo Complete W/ Cont if Necessary Per Protocol    Stress Test With Myocardial Perfusion One Day       Nervous and Auditory    Chest pain    Relevant Orders    Adult Transthoracic Echo Complete W/ Cont if Necessary Per Protocol    Stress Test With Myocardial Perfusion One Day              Recommendation  1.  Patient with complaints of chest pain and shortness of breath.  He had episode of chest pain requiring hospital admission.  He took nitroglycerin since discharge because of an episode of chest pain.  I am very concerned about patient's continued symptoms on antianginal therapy.  We discussed catheterization but patient does not want to have that done.  Patient is aware of the risk of MI but will agree to undergo ischemia assessment.    2.  Therefore, will schedule for Lexiscan stress test for stratification.  3.  Echocardiogram to evaluate LV structure, LV function, rule out valvular disease, pericardial or great vessel disease.  Patient has mild lower extremity edema we would like to evaluate.  4.  I am starting patient on Ranexa for antianginal therapy and I want to expedite testing.  He has nitroglycerin available and will take for any episodes of chest discomfort and will report to the ER if it is not resolved.  We will see him back for follow-up.  He will follow with primary as scheduled                Patient's Body mass index is 29.61 kg/m². BMI is within normal parameters. No follow-up required..       Electronically signed by:

## 2019-09-03 ENCOUNTER — HOSPITAL ENCOUNTER (OUTPATIENT)
Dept: CARDIOLOGY | Facility: HOSPITAL | Age: 74
Discharge: HOME | End: 2019-09-03

## 2019-09-03 VITALS — HEIGHT: 69 IN | BODY MASS INDEX: 29.71 KG/M2 | WEIGHT: 200.62 LBS

## 2019-09-03 DIAGNOSIS — R07.9 CHEST PAIN, UNSPECIFIED TYPE: ICD-10-CM

## 2019-09-03 DIAGNOSIS — R06.02 SHORTNESS OF BREATH: ICD-10-CM

## 2019-09-03 DIAGNOSIS — R00.2 PALPITATIONS: ICD-10-CM

## 2019-09-03 PROCEDURE — 93306 TTE W/DOPPLER COMPLETE: CPT

## 2019-09-03 PROCEDURE — 25010000002 REGADENOSON 0.4 MG/5ML SOLUTION: Performed by: INTERNAL MEDICINE

## 2019-09-03 PROCEDURE — 0 TECHNETIUM SESTAMIBI: Performed by: INTERNAL MEDICINE

## 2019-09-03 PROCEDURE — 93017 CV STRESS TEST TRACING ONLY: CPT

## 2019-09-03 PROCEDURE — 93306 TTE W/DOPPLER COMPLETE: CPT | Performed by: INTERNAL MEDICINE

## 2019-09-03 PROCEDURE — A9500 TC99M SESTAMIBI: HCPCS | Performed by: INTERNAL MEDICINE

## 2019-09-03 PROCEDURE — 78452 HT MUSCLE IMAGE SPECT MULT: CPT | Performed by: INTERNAL MEDICINE

## 2019-09-03 PROCEDURE — 78452 HT MUSCLE IMAGE SPECT MULT: CPT

## 2019-09-03 PROCEDURE — 93018 CV STRESS TEST I&R ONLY: CPT | Performed by: INTERNAL MEDICINE

## 2019-09-03 RX ADMIN — TECHNETIUM TC 99M SESTAMIBI 1 DOSE: 1 INJECTION INTRAVENOUS at 13:08

## 2019-09-03 RX ADMIN — TECHNETIUM TC 99M SESTAMIBI 1 DOSE: 1 INJECTION INTRAVENOUS at 14:50

## 2019-09-03 RX ADMIN — REGADENOSON 0.4 MG: 0.08 INJECTION, SOLUTION INTRAVENOUS at 14:50

## 2019-09-08 LAB
BH CV ECHO MEAS - ACS: 1.7 CM
BH CV ECHO MEAS - AO MAX PG (FULL): 4.4 MMHG
BH CV ECHO MEAS - AO MAX PG: 10.3 MMHG
BH CV ECHO MEAS - AO MEAN PG (FULL): 2.7 MMHG
BH CV ECHO MEAS - AO MEAN PG: 5.5 MMHG
BH CV ECHO MEAS - AO ROOT AREA (BSA CORRECTED): 1.6
BH CV ECHO MEAS - AO ROOT AREA: 8.3 CM^2
BH CV ECHO MEAS - AO ROOT DIAM: 3.3 CM
BH CV ECHO MEAS - AO V2 MAX: 160.5 CM/SEC
BH CV ECHO MEAS - AO V2 MEAN: 108.1 CM/SEC
BH CV ECHO MEAS - AO V2 VTI: 36.7 CM
BH CV ECHO MEAS - BSA(HAYCOCK): 2.1 M^2
BH CV ECHO MEAS - BSA: 2.1 M^2
BH CV ECHO MEAS - BZI_BMI: 29.5 KILOGRAMS/M^2
BH CV ECHO MEAS - BZI_METRIC_HEIGHT: 175.3 CM
BH CV ECHO MEAS - BZI_METRIC_WEIGHT: 90.7 KG
BH CV ECHO MEAS - CI(CUBED): 8.9 L/MIN/M^2
BH CV ECHO MEAS - CI(TEICH): 6.5 L/MIN/M^2
BH CV ECHO MEAS - CO(CUBED): 18.5 L/MIN
BH CV ECHO MEAS - CO(TEICH): 13.4 L/MIN
BH CV ECHO MEAS - EDV(CUBED): 196.5 ML
BH CV ECHO MEAS - EDV(TEICH): 167.5 ML
BH CV ECHO MEAS - EF(CUBED): 61.8 %
BH CV ECHO MEAS - EF(TEICH): 52.6 %
BH CV ECHO MEAS - ESV(CUBED): 75 ML
BH CV ECHO MEAS - ESV(TEICH): 79.4 ML
BH CV ECHO MEAS - FS: 27.4 %
BH CV ECHO MEAS - IVS/LVPW: 0.87
BH CV ECHO MEAS - IVSD: 1 CM
BH CV ECHO MEAS - LA DIMENSION(2D): 4.7 CM
BH CV ECHO MEAS - LA DIMENSION: 4.5 CM
BH CV ECHO MEAS - LA/AO: 1.4
BH CV ECHO MEAS - LAT PEAK E' VEL: 7 CM/SEC
BH CV ECHO MEAS - LV IVRT: 0.09 SEC
BH CV ECHO MEAS - LV MASS(C)D: 273.7 GRAMS
BH CV ECHO MEAS - LV MASS(C)DI: 132.5 GRAMS/M^2
BH CV ECHO MEAS - LV MAX PG: 5.9 MMHG
BH CV ECHO MEAS - LV MEAN PG: 2.8 MMHG
BH CV ECHO MEAS - LV V1 MAX: 121.1 CM/SEC
BH CV ECHO MEAS - LV V1 MEAN: 76.1 CM/SEC
BH CV ECHO MEAS - LV V1 VTI: 30 CM
BH CV ECHO MEAS - LVIDD: 5.8 CM
BH CV ECHO MEAS - LVIDS: 4.2 CM
BH CV ECHO MEAS - LVPWD: 1.2 CM
BH CV ECHO MEAS - MED PEAK E' VEL: 6 CM/SEC
BH CV ECHO MEAS - MITRAL HR: 118.8 BPM
BH CV ECHO MEAS - MITRAL R-R: 0.51 SEC
BH CV ECHO MEAS - MM HR: 152.1 BPM
BH CV ECHO MEAS - MM R-R INT: 0.39 SEC
BH CV ECHO MEAS - MV A MAX VEL: 92.9 CM/SEC
BH CV ECHO MEAS - MV DEC SLOPE: 254 CM/SEC^2
BH CV ECHO MEAS - MV DEC TIME: 0.26 SEC
BH CV ECHO MEAS - MV E MAX VEL: 66.8 CM/SEC
BH CV ECHO MEAS - MV E/A: 0.72
BH CV ECHO MEAS - MV MAX PG: 4.4 MMHG
BH CV ECHO MEAS - MV MEAN PG: 1.8 MMHG
BH CV ECHO MEAS - MV V2 MAX: 104.7 CM/SEC
BH CV ECHO MEAS - MV V2 MEAN: 62.9 CM/SEC
BH CV ECHO MEAS - MV V2 VTI: 31.8 CM
BH CV ECHO MEAS - PA MAX PG (FULL): 0.49 MMHG
BH CV ECHO MEAS - PA MAX PG: 4.8 MMHG
BH CV ECHO MEAS - PA MEAN PG (FULL): 0.53 MMHG
BH CV ECHO MEAS - PA MEAN PG: 2.6 MMHG
BH CV ECHO MEAS - PA V2 MAX: 109.7 CM/SEC
BH CV ECHO MEAS - PA V2 MEAN: 76.2 CM/SEC
BH CV ECHO MEAS - PA V2 VTI: 21.9 CM
BH CV ECHO MEAS - PULM. HR: 206.4 BPM
BH CV ECHO MEAS - PULM. R-R: 0.29 SEC
BH CV ECHO MEAS - RAP SYSTOLE: 10 MMHG
BH CV ECHO MEAS - RV MAX PG: 4.3 MMHG
BH CV ECHO MEAS - RV MEAN PG: 2.1 MMHG
BH CV ECHO MEAS - RV V1 MAX: 104 CM/SEC
BH CV ECHO MEAS - RV V1 MEAN: 67.2 CM/SEC
BH CV ECHO MEAS - RV V1 VTI: 24.2 CM
BH CV ECHO MEAS - RVDD: 3 CM
BH CV ECHO MEAS - RVSP: 22 MMHG
BH CV ECHO MEAS - SI(AO): 147.3 ML/M^2
BH CV ECHO MEAS - SI(CUBED): 58.8 ML/M^2
BH CV ECHO MEAS - SI(TEICH): 42.6 ML/M^2
BH CV ECHO MEAS - SV(AO): 304.3 ML
BH CV ECHO MEAS - SV(CUBED): 121.5 ML
BH CV ECHO MEAS - SV(TEICH): 88.1 ML
BH CV ECHO MEAS - TR MAX VEL: 165.5 CM/SEC
BH CV ECHO MEASUREMENTS AVERAGE E/E' RATIO: 10.28
BH CV NUCLEAR PRIOR STUDY: 3
BH CV STRESS COMMENTS STAGE 1: NORMAL
BH CV STRESS DOSE REGADENOSON STAGE 1: 0.4
BH CV STRESS DURATION MIN STAGE 1: 0
BH CV STRESS DURATION SEC STAGE 1: 10
BH CV STRESS PROTOCOL 1: NORMAL
BH CV STRESS RECOVERY BP: NORMAL MMHG
BH CV STRESS RECOVERY HR: 81 BPM
BH CV STRESS STAGE 1: 1
MAXIMAL PREDICTED HEART RATE: 147 BPM
MAXIMAL PREDICTED HEART RATE: 147 BPM
PERCENT MAX PREDICTED HR: 57.82 %
STRESS BASELINE BP: NORMAL MMHG
STRESS BASELINE HR: 72 BPM
STRESS PERCENT HR: 68 %
STRESS POST PEAK BP: NORMAL MMHG
STRESS POST PEAK HR: 85 BPM
STRESS TARGET HR: 125 BPM
STRESS TARGET HR: 125 BPM

## 2019-09-10 ENCOUNTER — TELEPHONE (OUTPATIENT)
Dept: CARDIOLOGY | Facility: CLINIC | Age: 74
End: 2019-09-10

## 2019-09-10 NOTE — TELEPHONE ENCOUNTER
Left message for patient to return call. Will continue to call patient. He does have an appointment with Dr Lazaro tomorrow. Tammy Peters MA        ----- Message from Brittnee Diaz MA sent at 9/9/2019  5:36 PM EDT -----  Left message to return call regarding stress and echo results.        Notes recorded by Nasir Larson PA on 9/9/2019 at 1:51 PM EDT  1-2 week follow up    Result Text     1.  Very mild inferobasilar and diaphragmatic ischemia.     2.  Mildly depressed post stress ejection fraction of 50% with no focal wall motion abnormalities.     3.  No evidence of pharmacologically-induced ischemic dilation nor of increased lung uptake of radiopharmaceutical.         Notes recorded by Nasir Larson PA on 9/9/2019 at 1:47 PM EDT  Routine follow up    Echocardiogram Findings     Left Ventricle Estimated EF appears to be in the range of 51 - 55%. The left ventricular cavity is moderately dilated. Left ventricular wall thickness is consistent with mild concentric hypertrophy. Left ventricular diastolic dysfunction is noted (grade I) consistent with impaired relaxation.   Right Ventricle Normal right ventricular cavity size and septal motion noted.   Left Atrium Left atrial cavity size is mild-to-moderately dilated. Left atrial volume is mildly increased.   Right Atrium Right atrial cavity size is mildly dilated.   Aortic Valve The aortic valve is grossly normal in structure. The valve was poorly visualized but appears trileaflet. Trace-to-mild aortic valve regurgitation is present. No aortic valve stenosis is present.   Mitral Valve The mitral valve is normal in structure. Trace-to-mild mitral valve regurgitation is present. No significant mitral valve stenosis is present.   Tricuspid Valve The tricuspid valve is normal. No evidence of tricuspid valve stenosis is present. Trace to mild tricuspid valve regurgitation is present. Estimated right ventricular systolic pressure from tricuspid regurgitation  is normal (<35 mmHg).   Pulmonic Valve The pulmonic valve is structurally normal. There is no significant pulmonic valve stenosis present. There is no significant pulmonic valve regurgitation present.   Greater Vessels No dilation of the aortic root is present. No dilation of the sinuses of Valsalva is present.   Pericardium The pericardium is normal. There is no evidence of pericardial effusion.

## 2019-09-11 ENCOUNTER — OFFICE VISIT (OUTPATIENT)
Dept: CARDIOLOGY | Facility: CLINIC | Age: 74
End: 2019-09-11

## 2019-09-11 VITALS
WEIGHT: 198 LBS | HEIGHT: 69 IN | SYSTOLIC BLOOD PRESSURE: 152 MMHG | OXYGEN SATURATION: 97 % | HEART RATE: 80 BPM | DIASTOLIC BLOOD PRESSURE: 81 MMHG | BODY MASS INDEX: 29.33 KG/M2

## 2019-09-11 DIAGNOSIS — R60.0 BILATERAL LEG EDEMA: ICD-10-CM

## 2019-09-11 DIAGNOSIS — R53.83 OTHER FATIGUE: ICD-10-CM

## 2019-09-11 DIAGNOSIS — E78.2 MIXED HYPERLIPIDEMIA: ICD-10-CM

## 2019-09-11 DIAGNOSIS — R06.00 PND (PAROXYSMAL NOCTURNAL DYSPNEA): ICD-10-CM

## 2019-09-11 DIAGNOSIS — R06.01 ORTHOPNEA: ICD-10-CM

## 2019-09-11 DIAGNOSIS — R00.2 PALPITATIONS: Primary | ICD-10-CM

## 2019-09-11 DIAGNOSIS — R07.2 PRECORDIAL PAIN: ICD-10-CM

## 2019-09-11 DIAGNOSIS — R06.02 SHORTNESS OF BREATH: ICD-10-CM

## 2019-09-11 DIAGNOSIS — I10 ESSENTIAL HYPERTENSION: ICD-10-CM

## 2019-09-11 DIAGNOSIS — G47.33 OBSTRUCTIVE SLEEP APNEA SYNDROME: ICD-10-CM

## 2019-09-11 PROCEDURE — 99213 OFFICE O/P EST LOW 20 MIN: CPT | Performed by: INTERNAL MEDICINE

## 2019-09-11 RX ORDER — VARDENAFIL HCL 20 MG
TABLET ORAL
Refills: 2 | COMMUNITY
Start: 2019-06-14

## 2019-09-11 NOTE — PROGRESS NOTES
"Subjective   Mitchell Jason is a 74 y.o. male     Chief Complaint   Patient presents with   • Hypertension     Here for testing f/u   • Palpitations       PROBLEM LIST:        1.  Chest pain     1.1 Stress test, 9-3-19, Very mild inferobasilar and     diaphragmatic ischemia.     2.  Palpitations      3.  Shortness of breath     4.   Hypertension      5.   Mixed hyperlipidemia     6.   KULDEEP     7. Diastolic Dysfunction     7.1 Echo, 9-3-19, EF 51-55%, grade 1 DD, trace-mild MR/TR,    Pulm. Pressures < 35 mmHg                Specialty Problems        Cardiology Problems    Essential hypertension        Mixed hyperlipidemia        Palpitations                HPI:  Mr. Jason returns for follow-up on test results.    Apparently, all of his chest discomfort resolved with initiation of Ranexa therapy.  He denies any type of chest pain at this time.  He continues to deny orthopnea, PND, or lower extremity edema.  Mr. Jason has very infrequent \"fluttering\" in his chest which lasts only seconds, which is not associated with pain, shortness of breath, or lightheadedness, which is not precipitated by exertion, and which is not changed over a prolonged period of time.  He has never been syncopal, and there is no family history of sudden cardiac death.    Echo demonstrated preserved LV systolic function grade 1A diastolic dysfunction and no significant valve, pericardial, or great vessel pathology.  There was mild to moderate concentric left ventricular hypertrophy.    Stress test demonstrated possible inferior wall ischemia (versus diaphragmatic attenuation) with a post stress ejection fraction of 50%.                      CURRENT MEDICATION:    Current Outpatient Medications   Medication Sig Dispense Refill   • amLODIPine (NORVASC) 5 MG tablet Daily.  6   • aspirin 81 MG tablet Take 1 tablet by mouth Daily. 30 tablet 11   • atenolol (TENORMIN) 25 MG tablet Take 1 tablet by mouth Daily. 30 tablet 11   • atorvastatin " (LIPITOR) 40 MG tablet Take 1 tablet by mouth Every Night. 30 tablet 11   • CloNIDine (CATAPRES) 0.1 MG tablet Take 0.1 mg by mouth 2 (Two) Times a Day As Needed.  3   • furosemide (LASIX) 40 MG tablet Take 40 mg by mouth Daily.  3   • ibuprofen (ADVIL,MOTRIN) 600 MG tablet Take 600 mg by mouth 2 (Two) Times a Day As Needed. for pain  1   • LEVITRA 20 MG tablet TAKE 1/2 TABLET ONE HOUR PRIOR TO INTERCOURSE NO MORE THAN ONE IN 24 HOURS  2   • nitroglycerin (NITROSTAT) 0.4 MG SL tablet 1 under the tongue as needed for angina, may repeat q5mins for up three doses within 15 minutes 25 tablet 2   • omeprazole (priLOSEC) 20 MG capsule Take 20 mg by mouth Daily.  6   • potassium chloride (K-DUR) 10 MEQ CR tablet Take 10 mEq by mouth Daily.  3   • ranolazine (RANEXA) 500 MG 12 hr tablet Take 1 tablet by mouth 2 (Two) Times a Day. 60 tablet 6     No current facility-administered medications for this visit.        ALLERGIES:    Codeine    PAST MEDICAL HISTORY:    Past Medical History:   Diagnosis Date   • Chest pain    • GERD (gastroesophageal reflux disease)    • Hyperlipidemia    • Hypertension    • Sleep apnea     does not have machine anymore, [facility]       SURGICAL HISTORY:    Past Surgical History:   Procedure Laterality Date   • BACK SURGERY     • INNER EAR SURGERY         SOCIAL HISTORY:    Social History     Socioeconomic History   • Marital status:      Spouse name: Not on file   • Number of children: Not on file   • Years of education: Not on file   • Highest education level: Not on file   Tobacco Use   • Smoking status: Never Smoker   • Smokeless tobacco: Never Used   Substance and Sexual Activity   • Alcohol use: Yes     Frequency: Never     Comment: wine and alcohol   • Drug use: No       FAMILY HISTORY:    Family History   Problem Relation Age of Onset   • No Known Problems Mother    • Hypertension Father        Review of Systems   Constitutional: Negative.    HENT: Positive for hearing loss.   "  Eyes: Positive for visual disturbance (glasses prn).   Respiratory: Negative.    Cardiovascular: Positive for palpitations and leg swelling. Negative for chest pain.   Gastrointestinal: Negative.    Endocrine: Negative.    Genitourinary: Negative.    Musculoskeletal: Positive for joint swelling (r/t gout).   Skin: Negative.    Allergic/Immunologic: Negative.    Neurological: Negative.    Hematological: Bruises/bleeds easily (bruise).   Psychiatric/Behavioral: Positive for sleep disturbance.       VISIT VITALS:  Vitals:    09/11/19 0953   BP: 152/81   BP Location: Left arm   Patient Position: Sitting   Pulse: 80   SpO2: 97%   Weight: 89.8 kg (198 lb)   Height: 175.3 cm (69\")      /81 (BP Location: Left arm, Patient Position: Sitting)   Pulse 80   Ht 175.3 cm (69\")   Wt 89.8 kg (198 lb)   SpO2 97%   BMI 29.24 kg/m²     RECENT LABS:    Objective       Physical Exam    Procedures      Assessment/Plan   For 1.  Chest pain.  Symptoms have resolved with Ranexa.  The patient routinely functions at moderate to moderately high levels of physical activity without chest pain, dyspnea, or other untoward symptoms.  Therefore, as the patient is asymptomatic at relatively high levels of functional capacity, and without high risk markers on stress testing, I do not feel that further evaluation for ischemia is indicated.  However, I had a detailed discussion with Mr. Jason today reference symptoms of ischemia, heart failure, or malignant dysrhythmia.  He will report any of these immediately.    2.  Treated hypertension.  Blood pressures are,  for the most part, well controlled.  Clinical follow-up will be pursued through Sarah Kelly's office.    3.  Treated dyslipidemia.    4.  I have asked Mr. Jason to follow with Sarah Kelly per her instructions, and we will see him in on a as needed basis for symptoms as discussed or in 6 months.  Precautions reference to the use of Levitra and nitrates will be reinforced.   " Diagnosis Plan   1. Palpitations     2. Essential hypertension     3. Mixed hyperlipidemia     4. Shortness of breath     5. Obstructive sleep apnea syndrome     6. Orthopnea     7. PND (paroxysmal nocturnal dyspnea)     8. Bilateral leg edema     9. Other fatigue         No Follow-up on file.       Patient's Body mass index is 29.24 kg/m². BMI is above normal parameters. Recommendations include: educational material and referral to primary care.       Damaris Chirinos LPN    Scribed for Dr. Ruy Lazaro by Damaris Chirinos LPN September 11, 2019 10:45 AM         Electronically signed by:            This note is dictated utilizing voice recognition software.  Although this record has been proof read, transcriptional errors may still be present. If questions occur regarding the content of this record please do not hesitate to call our office.

## 2019-09-11 NOTE — PATIENT INSTRUCTIONS
"Fat and Cholesterol Restricted Eating Plan  Getting too much fat and cholesterol in your diet may cause health problems. Choosing the right foods helps keep your fat and cholesterol at normal levels. This can keep you from getting certain diseases.  Your doctor may recommend an eating plan that includes:  · Total fat: ______% or less of total calories a day.  · Saturated fat: ______% or less of total calories a day.  · Cholesterol: less than _________mg a day.  · Fiber: ______g a day.  What are tips for following this plan?  General tips    · Work with your doctor to lose weight if you need to.  · Avoid:  ? Foods with added sugar.  ? Fried foods.  ? Foods with partially hydrogenated oils.  · Limit alcohol intake to no more than 1 drink a day for nonpregnant women and 2 drinks a day for men. One drink equals 12 oz of beer, 5 oz of wine, or 1½ oz of hard liquor.  Reading food labels  · Check food labels for:  ? Trans fats.  ? Partially hydrogenated oils.  ? Saturated fat (g) in each serving.  ? Cholesterol (mg) in each serving.  ? Fiber (g) in each serving.  · Choose foods with healthy fats, such as:  ? Monounsaturated fats.  ? Polyunsaturated fats.  ? Omega-3 fats.  · Choose grain products that have whole grains. Look for the word \"whole\" as the first word in the ingredient list.  Cooking  · Cook foods using low-fat methods. These include baking, boiling, grilling, and broiling.  · Eat more home-cooked foods. Eat at restaurants and buffets less often.  · Avoid cooking using saturated fats, such as butter, cream, palm oil, palm kernel oil, and coconut oil.  Meal planning    · At meals, divide your plate into four equal parts:  ? Fill one-half of your plate with vegetables and green salads.  ? Fill one-fourth of your plate with whole grains.  ? Fill one-fourth of your plate with low-fat (lean) protein foods.  · Eat fish that is high in omega-3 fats at least two times a week. This includes mackerel, tuna, sardines, and " salmon.  · Eat foods that are high in fiber, such as whole grains, beans, apples, broccoli, carrots, peas, and barley.  Recommended foods  Grains  · Whole grains, such as whole wheat or whole grain breads, crackers, cereals, and pasta. Unsweetened oatmeal, bulgur, barley, quinoa, or brown rice. Corn or whole wheat flour tortillas.  Vegetables  · Fresh or frozen vegetables (raw, steamed, roasted, or grilled). Green salads.  Fruits  · All fresh, canned (in natural juice), or frozen fruits.  Meats and other protein foods  · Ground beef (85% or leaner), grass-fed beef, or beef trimmed of fat. Skinless chicken or turkey. Ground chicken or turkey. Pork trimmed of fat. All fish and seafood. Egg whites. Dried beans, peas, or lentils. Unsalted nuts or seeds. Unsalted canned beans. Nut butters without added sugar or oil.  Dairy  · Low-fat or nonfat dairy products, such as skim or 1% milk, 2% or reduced-fat cheeses, low-fat and fat-free ricotta or cottage cheese, or plain low-fat and nonfat yogurt.  Fats and oils  · Tub margarine without trans fats. Light or reduced-fat mayonnaise and salad dressings. Avocado. Olive, canola, sesame, or safflower oils.  The items listed above may not be a complete list of recommended foods or beverages. Contact your dietitian for more options.  The items listed above may not be a complete list of foods and beverages [you/your child] can eat. Contact a dietitian for more information.  Foods to avoid  Grains  · White bread. White pasta. White rice. Cornbread. Bagels, pastries, and croissants. Crackers and snack foods that contain trans fat and hydrogenated oils.  Vegetables  · Vegetables cooked in cheese, cream, or butter sauce. Fried vegetables.  Fruits  · Canned fruit in heavy syrup. Fruit in cream or butter sauce. Fried fruit.  Meats and other protein foods  · Fatty cuts of meat. Ribs, chicken wings, toussaint, sausage, bologna, salami, chitterlings, fatback, hot dogs, bratwurst, and packaged  lunch meats. Liver and organ meats. Whole eggs and egg yolks. Chicken and turkey with skin. Fried meat.  Dairy  · Whole or 2% milk, cream, half-and-half, and cream cheese. Whole milk cheeses. Whole-fat or sweetened yogurt. Full-fat cheeses. Nondairy creamers and whipped toppings. Processed cheese, cheese spreads, and cheese curds.  Beverages  · Alcohol. Sugar-sweetened drinks such as sodas, lemonade, and fruit drinks.  Fats and oils  · Butter, stick margarine, lard, shortening, ghee, or toussaint fat. Coconut, palm kernel, and palm oils.  Sweets and desserts  · Corn syrup, sugars, honey, and molasses. Candy. Jam and jelly. Syrup. Sweetened cereals. Cookies, pies, cakes, donuts, muffins, and ice cream.  The items listed above may not be a complete list of foods and beverages to avoid. Contact your dietitian for more information.  The items listed above may not be a complete list of foods and beverages [you/your child] should avoid. Contact a dietitian for more information.  Summary  · Choosing the right foods helps keep your fat and cholesterol at normal levels. This can keep you from getting certain diseases.  · At meals, fill one-half of your plate with vegetables and green salads.  · Eat high-fiber foods, like whole grains, beans, apples, carrots, peas, and barley.  · Limit added sugar, saturated fats, alcohol, and fried foods.  This information is not intended to replace advice given to you by your health care provider. Make sure you discuss any questions you have with your health care provider.  Document Released: 06/18/2013 Document Revised: 09/04/2018 Document Reviewed: 09/04/2018  [employer] Patient Education © 2019 [employer]  BMI for Adults    Body mass index (BMI) is a number that is calculated from a person's weight and height. BMI may help to estimate how much of a person's weight is composed of fat. BMI can help identify those who may be at higher risk for certain medical problems.  How is BMI  "used with adults?  BMI is used as a screening tool to identify possible weight problems. It is used to check whether a person is obese, overweight, healthy weight, or underweight.  How is BMI calculated?  BMI measures your weight and compares it to your height. This can be done either in English ([place] or metric measurements. Note that charts are available to help you find your BMI quickly and easily without having to do these calculations yourself.  To calculate your BMI in English ([place] measurements, your health care provider will:  1. Measure your weight in pounds (lb).  2. Multiply the number of pounds by 703.  ? For example, for a person who weighs 180 lb, multiply that number by 703, which equals 126,540.  3. Measure your height in inches (in). Then multiply that number by itself to get a measurement called \"inches squared.\"  ? For example, for a person who is 70 in tall, the \"inches squared\" measurement is 70 in x 70 in, which equals 4900 inches squared.  4. Divide the total from Step 2 (number of lb x 703) by the total from Step 3 (inches squared): 126,540 ÷ 4900 = 25.8. This is your BMI.  To calculate your BMI in metric measurements, your health care provider will:  1. Measure your weight in kilograms (kg).  2. Measure your height in meters (m). Then multiply that number by itself to get a measurement called \"meters squared.\"  ? For example, for a person who is 1.75 m tall, the \"meters squared\" measurement is 1.75 m x 1.75 m, which is equal to 3.1 meters squared.  3. Divide the number of kilograms (your weight) by the meters squared number. In this example: 70 ÷ 3.1 = 22.6. This is your BMI.  How is BMI interpreted?  To interpret your results, your health care provider will use BMI charts to identify whether you are underweight, normal weight, overweight, or obese. The following guidelines will be used:  · Underweight: BMI less than 18.5.  · Normal weight: BMI between 18.5 and 24.9.  · Overweight: BMI " between 25 and 29.9.  · Obese: BMI of 30 and above.  Please note:  · Weight includes both fat and muscle, so someone with a muscular build, such as an athlete, may have a BMI that is higher than 24.9. In cases like these, BMI is not an accurate measure of body fat.  · To determine if excess body fat is the cause of a BMI of 25 or higher, further assessments may need to be done by a health care provider.  · BMI is usually interpreted in the same way for men and women.  Why is BMI a useful tool?  BMI is useful in two ways:  · Identifying a weight problem that may be related to a medical condition, or that may increase the risk for medical problems.  · Promoting lifestyle and diet changes in order to reach a healthy weight.  Summary  · Body mass index (BMI) is a number that is calculated from a person's weight and height.  · BMI may help to estimate how much of a person's weight is composed of fat. BMI can help identify those who may be at higher risk for certain medical problems.  · BMI can be measured using English measurements or metric measurements.  · To interpret your results, your health care provider will use BMI charts to identify whether you are underweight, normal weight, overweight, or obese.  This information is not intended to replace advice given to you by your health care provider. Make sure you discuss any questions you have with your health care provider.  Document Released: 08/29/2005 Document Revised: 10/31/2018 Document Reviewed: 10/31/2018  [employer] Interactive Patient Education © 2019 [employer]

## 2019-10-11 ENCOUNTER — TELEPHONE (OUTPATIENT)
Dept: CARDIOLOGY | Facility: CLINIC | Age: 74
End: 2019-10-11

## 2019-10-11 NOTE — TELEPHONE ENCOUNTER
Pt LVM stating that he needs a fax sent to the [facility]. No further information was left.           First attempt to reach pt. Left a voicemail for pt to return my call at [phone].  With return call, please find out what pt is needing.

## 2019-10-14 ENCOUNTER — TELEPHONE (OUTPATIENT)
Dept: CARDIOLOGY | Facility: CLINIC | Age: 74
End: 2019-10-14

## 2019-10-14 NOTE — TELEPHONE ENCOUNTER
Patient left message for recent visit on 9/11/19 and recent stress and echo results be faxed to [facility], left fax # [phone]. All faxed per patient. Nicole Euceda LPN